# Patient Record
Sex: MALE | Race: WHITE | NOT HISPANIC OR LATINO | Employment: OTHER | URBAN - NONMETROPOLITAN AREA
[De-identification: names, ages, dates, MRNs, and addresses within clinical notes are randomized per-mention and may not be internally consistent; named-entity substitution may affect disease eponyms.]

---

## 2017-02-16 ENCOUNTER — TELEPHONE (OUTPATIENT)
Dept: CARDIOLOGY | Facility: CLINIC | Age: 82
End: 2017-02-16

## 2017-02-16 RX ORDER — DILTIAZEM HYDROCHLORIDE 180 MG/1
180 CAPSULE, COATED, EXTENDED RELEASE ORAL DAILY
Qty: 90 CAPSULE | Refills: 0 | Status: SHIPPED | OUTPATIENT
Start: 2017-02-16 | End: 2017-02-16 | Stop reason: SDUPTHER

## 2017-02-16 RX ORDER — LISINOPRIL 5 MG/1
5 TABLET ORAL DAILY
COMMUNITY
End: 2017-04-13

## 2017-02-16 RX ORDER — DILTIAZEM HYDROCHLORIDE 180 MG/1
180 CAPSULE, COATED, EXTENDED RELEASE ORAL 2 TIMES DAILY
Qty: 180 CAPSULE | Refills: 3 | Status: SHIPPED | OUTPATIENT
Start: 2017-02-16 | End: 2018-12-05 | Stop reason: ALTCHOICE

## 2017-02-16 NOTE — TELEPHONE ENCOUNTER
Received call from fátima at [facility] reports wife in pharmacy requesting refill on patient's diltiazem, script sent into pharmacy.

## 2017-02-16 NOTE — TELEPHONE ENCOUNTER
His heart rate was 68.  We need to make sure how he is taking it.  If only once a day I am afraid BID would drop him too low.

## 2017-02-16 NOTE — TELEPHONE ENCOUNTER
Called patient's wife and went over med list. Abstracted med list into chart. Patient has been taking diltiazem 180 mg BID. Script reflecting BID dosing sent to pharmacy per Demetria's order.

## 2017-02-16 NOTE — TELEPHONE ENCOUNTER
Had refill request to today for Cardizem CD 180mg. He did not have medication list at last visit yet wife brought list with Cardizem CD 180mg once daily. Pharmacy reports he has always took it bid. Wife reports she is not for sure what he is taking. Per chart last refill here bid. Does he need once daily or twice daily? Thanks

## 2017-04-13 ENCOUNTER — HOSPITAL ENCOUNTER (OUTPATIENT)
Dept: CARDIOLOGY | Facility: HOSPITAL | Age: 82
Discharge: HOME | End: 2017-04-13
Admitting: NURSE PRACTITIONER

## 2017-04-13 ENCOUNTER — OUTSIDE FACILITY SERVICE (OUTPATIENT)
Dept: CARDIOLOGY | Facility: CLINIC | Age: 82
End: 2017-04-13

## 2017-04-13 ENCOUNTER — TELEPHONE (OUTPATIENT)
Dept: CARDIOLOGY | Facility: CLINIC | Age: 82
End: 2017-04-13

## 2017-04-13 ENCOUNTER — OFFICE VISIT (OUTPATIENT)
Dept: CARDIOLOGY | Facility: CLINIC | Age: 82
End: 2017-04-13

## 2017-04-13 VITALS
HEART RATE: 76 BPM | SYSTOLIC BLOOD PRESSURE: 152 MMHG | BODY MASS INDEX: 24.08 KG/M2 | DIASTOLIC BLOOD PRESSURE: 94 MMHG | WEIGHT: 172 LBS | HEIGHT: 71 IN

## 2017-04-13 DIAGNOSIS — R94.39 ABNORMAL STRESS TEST: ICD-10-CM

## 2017-04-13 DIAGNOSIS — I34.0 NON-RHEUMATIC MITRAL REGURGITATION: ICD-10-CM

## 2017-04-13 DIAGNOSIS — I25.10 CORONARY ARTERY DISEASE INVOLVING NATIVE CORONARY ARTERY OF NATIVE HEART WITHOUT ANGINA PECTORIS: ICD-10-CM

## 2017-04-13 DIAGNOSIS — I10 ESSENTIAL HYPERTENSION: Primary | ICD-10-CM

## 2017-04-13 DIAGNOSIS — E78.00 HYPERCHOLESTEREMIA: ICD-10-CM

## 2017-04-13 DIAGNOSIS — I10 ESSENTIAL HYPERTENSION: ICD-10-CM

## 2017-04-13 PROCEDURE — 93306 TTE W/DOPPLER COMPLETE: CPT

## 2017-04-13 PROCEDURE — 93306 TTE W/DOPPLER COMPLETE: CPT | Performed by: INTERNAL MEDICINE

## 2017-04-13 PROCEDURE — 99214 OFFICE O/P EST MOD 30 MIN: CPT | Performed by: NURSE PRACTITIONER

## 2017-04-13 RX ORDER — OXYCODONE HYDROCHLORIDE AND ACETAMINOPHEN 5; 325 MG/1; MG/1
1 TABLET ORAL EVERY 6 HOURS PRN
COMMUNITY
End: 2017-10-17 | Stop reason: ALTCHOICE

## 2017-04-13 RX ORDER — FINASTERIDE 5 MG/1
5 TABLET, FILM COATED ORAL DAILY
COMMUNITY

## 2017-04-13 NOTE — TELEPHONE ENCOUNTER
Dr. Hoskins office faxed cardiac clearance request for T10 and T11 kyphoplasty. Asking how many days prior to surgery patient can hold aspirin. Patient seen in office today and echo was ordered.

## 2017-04-13 NOTE — PROGRESS NOTES
Chief Complaint   Patient presents with   • Follow-up     Denies chest pain, shortness of breath, or palpitations. Had another kyphoplasty in november is to have another at the end of this month. Wife is very confused about medication. Has some notes from pharmacist she wants you to look at.        Subjective       Max Velarde is a 82 y.o. male  with a history of chest pain and shortness of breath who had abnormal stress test being managed conservatively. He has developed problems with his back and underwent surgery last year without problem. Today he comes to the office for a follow up appointment. Patient reports recent MRI of back revealed more fractures and needs to have surgery to repair due to significant back pain. From a cardiac standpoint he denies chest pain or palpitations. He does have a history of PVCs. He denies any significant shortness of breath.     HPI         Cardiac History:    Past Surgical History:   Procedure Laterality Date   • CARDIOVASCULAR STRESS TEST  03/17/2008    7 min, 81% THR, Apicoinferior Ischemia.   • CARDIOVASCULAR STRESS TEST  06/15/2010    7 min, 30 sec, 77% THR. Inferior ischemia.   • ECHO - CONVERTED  03/04/2008    EF 60%, Mild AI, Mod MR, Freq PVC's, Anterior WMA.   • ECHO - CONVERTED  04/22/2010    EF 60 %, mild to mod MR       Current Outpatient Prescriptions   Medication Sig Dispense Refill   • carvedilol (COREG) 3.125 MG tablet Take 3.125 mg by mouth 2 (Two) Times a Day With Meals.     • diltiaZEM CD (CARDIZEM CD) 180 MG 24 hr capsule Take 1 capsule by mouth 2 (Two) Times a Day. 180 capsule 3   • finasteride (PROSCAR) 5 MG tablet Take 5 mg by mouth Daily.     • Fluticasone Furoate-Vilanterol (BREO ELLIPTA) 100-25 MCG/INH aerosol powder  Inhale.     • oxyCODONE-acetaminophen (PERCOCET) 5-325 MG per tablet Take 1 tablet by mouth Every 6 (Six) Hours As Needed.     • simvastatin (ZOCOR) 40 MG tablet Take 40 mg by mouth Every Night.       No current facility-administered  "medications for this visit.        Review of patient's allergies indicates no known allergies.    Past Medical History:   Diagnosis Date   • H/O kyphoplasty 08/12/2016   • History of elbow surgery     (r) ELBOW LANCED   • Hypercholesterolemia    • Hyperlipidemia    • Hypertension    • MR (mitral regurgitation)    • PVC's (premature ventricular contractions)        Social History     Social History   • Marital status:      Spouse name: N/A   • Number of children: N/A   • Years of education: N/A     Occupational History   • Not on file.     Social History Main Topics   • Smoking status: Former Smoker     Quit date: 1956   • Smokeless tobacco: Never Used   • Alcohol use No   • Drug use: No   • Sexual activity: Not on file     Other Topics Concern   • Not on file     Social History Narrative       Family History   Problem Relation Age of Onset   • Heart disease Father        Review of Systems   Constitutional: Positive for activity change (related to back pain).   HENT: Negative.  Negative for trouble swallowing.    Respiratory: Negative.    Cardiovascular: Negative.    Gastrointestinal: Negative.    Genitourinary: Negative.    Musculoskeletal: Positive for arthralgias, back pain, myalgias, neck pain and neck stiffness. Negative for gait problem.   Neurological: Negative for dizziness, syncope, speech difficulty and light-headedness.   Psychiatric/Behavioral: Positive for sleep disturbance (related to skeletal issues). Negative for confusion. The patient is not nervous/anxious.        Diabetes- No  Thyroid-normal    Objective     /94  Pulse 76  Ht 71\" (180.3 cm)  Wt 172 lb (78 kg)  BMI 23.99 kg/m2    Physical Exam   Constitutional: He is oriented to person, place, and time.   HENT:   Right Ear: Decreased hearing is noted.   Left Ear: Decreased hearing is noted.   Eyes: Pupils are equal, round, and reactive to light.   Neck: No JVD present. Carotid bruit is not present. No edema present. "   Cardiovascular: Normal rate, regular rhythm, S1 normal and normal pulses.    Murmur heard.   Systolic murmur is present with a grade of 2/6   Slightly loud S2, heart rhythm regular with a skip noted   Pulmonary/Chest: Effort normal and breath sounds normal. He has no wheezes. He has no rales.   Abdominal: Soft. Bowel sounds are normal.   Musculoskeletal: He exhibits no edema.   Neurological: He is alert and oriented to person, place, and time.   Skin: Skin is warm and dry.   Psychiatric: He has a normal mood and affect. His behavior is normal. Judgment and thought content normal.   Vitals reviewed.    Procedures          Assessment/Plan      Max was seen today for follow-up.    Diagnoses and all orders for this visit:    Essential hypertension  -     Adult Transthoracic Echo Complete; Future    Coronary artery disease involving native coronary artery of native heart without angina pectoris  -     Adult Transthoracic Echo Complete; Future    Abnormal stress test  -     Adult Transthoracic Echo Complete; Future    Hypercholesteremia  -     Adult Transthoracic Echo Complete; Future    Non-rheumatic mitral regurgitation         and Mrs. Velarde appear to be having problems managing medication prescriptions due to prescriptions coming from different offices. We discussed requesting all prescriptions come from your office and Mrs. Velarde will discuss with you at his follow up appointment on 24th of this month.    I did not make any medication changes today. He appears to currently be on CCB and BB. In the past he has been on Lisiopril. If blood pressure remains elevated he may benefit in resuming if renal function, sodium and potassium normal.   Mr. Velarde is requesting cardiac clearance for Kyphoplasty by Dr. Jules Valencia.  Due to increased blood pressure and history of abnormal stress test, I have asked for repeat echocardiogram and is scheduled for later today.  Since he denies cardiac symptoms and he feels he  would not be able to lay on table for nuclear scan a repeat nuclear stress test was not ordered at this time.                 Electronically signed by GABRIELE Chen,  April 14, 2017 7:50 AM

## 2017-04-14 ENCOUNTER — TELEPHONE (OUTPATIENT)
Dept: CARDIOLOGY | Facility: CLINIC | Age: 82
End: 2017-04-14

## 2017-04-14 PROBLEM — I34.0 NON-RHEUMATIC MITRAL REGURGITATION: Status: ACTIVE | Noted: 2017-04-14

## 2017-04-14 PROBLEM — E78.00 HYPERCHOLESTEREMIA: Status: ACTIVE | Noted: 2017-04-14

## 2017-04-14 NOTE — TELEPHONE ENCOUNTER
Mr. Velarde requested cardiac clearance be sent to Dr. Jules Valencia office for kyphoplasty. I ordered an echocardiogram which was done yesterday due to being over 5 years. He denies any cardiac symptoms. I did not order stress test since he would not be able to lay on table due to back pain. When echo report is available please verify with Dr. Murguia agree to give clearance. Thanks.

## 2017-10-17 ENCOUNTER — OFFICE VISIT (OUTPATIENT)
Dept: CARDIOLOGY | Facility: CLINIC | Age: 82
End: 2017-10-17

## 2017-10-17 VITALS
SYSTOLIC BLOOD PRESSURE: 130 MMHG | HEIGHT: 71 IN | HEART RATE: 64 BPM | WEIGHT: 163 LBS | DIASTOLIC BLOOD PRESSURE: 90 MMHG | BODY MASS INDEX: 22.82 KG/M2

## 2017-10-17 DIAGNOSIS — R94.39 ABNORMAL STRESS TEST: ICD-10-CM

## 2017-10-17 DIAGNOSIS — I34.0 NON-RHEUMATIC MITRAL REGURGITATION: ICD-10-CM

## 2017-10-17 DIAGNOSIS — I25.10 CORONARY ARTERY DISEASE INVOLVING NATIVE CORONARY ARTERY OF NATIVE HEART WITHOUT ANGINA PECTORIS: Primary | ICD-10-CM

## 2017-10-17 DIAGNOSIS — E78.00 HYPERCHOLESTEREMIA: ICD-10-CM

## 2017-10-17 DIAGNOSIS — I10 ESSENTIAL HYPERTENSION: ICD-10-CM

## 2017-10-17 PROBLEM — I49.3 PVC (PREMATURE VENTRICULAR CONTRACTION): Status: ACTIVE | Noted: 2017-10-17

## 2017-10-17 PROCEDURE — 99213 OFFICE O/P EST LOW 20 MIN: CPT | Performed by: NURSE PRACTITIONER

## 2017-10-17 RX ORDER — LISINOPRIL 10 MG/1
10 TABLET ORAL DAILY
COMMUNITY
End: 2018-12-05 | Stop reason: ALTCHOICE

## 2017-10-17 NOTE — PROGRESS NOTES
Chief Complaint   Patient presents with   • Follow-up     For cardiac management.    • Med Refill     PCP does medication refills.        Cardiac Complaints  none      Subjective   Max Velarde is a 83 y.o. male with a history of chest pain and shortness of breath who had abnormal stress test being managed conservatively. He reported more shortness of breath at last visit and echo was advised which showed normal LV function with mild to moderate MR and no AS reported.  Stress was not advised as he was scheduled to undergo surgery for his back and was not able to lay flat on the table.  He was then cleared to undergo kyphoplasty. He returns today and denies any new cardiac concerns.  He does report emergent surgery in June for bowel obstruction. Which he states he was in the hospital for three weeks for recovery.  Chest pain and shortness of breath denied.  He still admits to palpitations, but no worse than prior.  He states they have been the same for years.  His biggest problem remains back pain, which the patient states is no worse than prior. Labs he reports with PCP, no copy available for review.  No refills are needed today.      Cardiac History  Past Surgical History:   Procedure Laterality Date   • CARDIOVASCULAR STRESS TEST  03/17/2008    7 min, 81% THR, Apicoinferior Ischemia.   • CARDIOVASCULAR STRESS TEST  06/15/2010    7 min, 30 sec, 77% THR. Inferior ischemia.   • ECHO - CONVERTED  03/04/2008    EF 60%, Mild AI, Mod MR, Freq PVC's, Anterior WMA.   • ECHO - CONVERTED  04/22/2010    EF 60 %, mild to mod MR   • ECHO - CONVERTED  04/13/2017    EF 65%, no AS, mild to mod MR       Current Outpatient Prescriptions   Medication Sig Dispense Refill   • carvedilol (COREG) 3.125 MG tablet Take 3.125 mg by mouth 2 (Two) Times a Day With Meals.     • diltiaZEM CD (CARDIZEM CD) 180 MG 24 hr capsule Take 1 capsule by mouth 2 (Two) Times a Day. 180 capsule 3   • finasteride (PROSCAR) 5 MG tablet Take 5 mg by mouth  "Daily.     • Fluticasone Furoate-Vilanterol (BREO ELLIPTA) 100-25 MCG/INH aerosol powder  Inhale.     • lisinopril (PRINIVIL,ZESTRIL) 10 MG tablet Take 10 mg by mouth Daily.     • simvastatin (ZOCOR) 40 MG tablet Take 40 mg by mouth Every Night.       No current facility-administered medications for this visit.        Review of patient's allergies indicates no known allergies.    Past Medical History:   Diagnosis Date   • H/O kyphoplasty 08/12/2016   • History of elbow surgery     (r) ELBOW LANCED   • Hypercholesterolemia    • Hyperlipidemia    • Hypertension    • MR (mitral regurgitation)    • PVC's (premature ventricular contractions)        Social History     Social History   • Marital status:      Spouse name: N/A   • Number of children: N/A   • Years of education: N/A     Occupational History   • Not on file.     Social History Main Topics   • Smoking status: Former Smoker     Quit date: 1956   • Smokeless tobacco: Never Used   • Alcohol use No   • Drug use: No   • Sexual activity: Not on file     Other Topics Concern   • Not on file     Social History Narrative       Family History   Problem Relation Age of Onset   • Heart disease Father        Review of Systems   Constitution: Negative for weakness and malaise/fatigue.   Cardiovascular: Negative for chest pain, dyspnea on exertion, irregular heartbeat and palpitations.   Respiratory: Negative for shortness of breath and wheezing.    Musculoskeletal: Positive for back pain and joint pain.   Gastrointestinal: Negative for anorexia, heartburn and nausea.   Genitourinary: Negative for dysuria, hesitancy and nocturia.   Neurological: Negative for dizziness, focal weakness and headaches.   Psychiatric/Behavioral: Negative for altered mental status and depression.       DiabetesNo  Thyroidnormal    Objective     /90 (BP Location: Right arm)  Pulse 64  Ht 71\" (180.3 cm)  Wt 163 lb (73.9 kg)  BMI 22.73 kg/m2    Physical Exam   Constitutional: He is " oriented to person, place, and time. He appears well-developed and well-nourished.   HENT:   Head: Normocephalic and atraumatic.   Eyes: EOM are normal. Pupils are equal, round, and reactive to light.   Neck: Normal range of motion. Neck supple.   Cardiovascular: Normal rate and regular rhythm.    Pulmonary/Chest: Effort normal and breath sounds normal.   Abdominal: Soft.   Musculoskeletal: Normal range of motion.   Neurological: He is alert and oriented to person, place, and time.   Skin: Skin is warm and dry.   Psychiatric: He has a normal mood and affect. His behavior is normal.       Procedures    Assessment/Plan     HR and BP are both stable.  No changes to current regimen advised.  No new testing will be advised today as on new concerns are voiced today.  Most recent echo findings discussed with patient.  He prefers to continue to hold off on stress as no new concerns voiced and he can not lay flat on the table. Labs are done with your office, no recent copy available to me.  Could we have most recent copy for our records?  No refills sent today as they are done with PCP.  Good cardiac diet and activity as tolerated advised. 6 month follow up advised or sooner if needed.      Problems Addressed this Visit        Cardiovascular and Mediastinum    HTN (hypertension)    Relevant Medications    lisinopril (PRINIVIL,ZESTRIL) 10 MG tablet    CAD (coronary artery disease) - Primary    Abnormal stress test    Hypercholesteremia    Non-rheumatic mitral regurgitation                  Electronically signed by GABRIELE Devine October 17, 2017 4:31 PM

## 2018-01-12 ENCOUNTER — TELEPHONE (OUTPATIENT)
Dept: CARDIOLOGY | Facility: CLINIC | Age: 83
End: 2018-01-12

## 2018-04-17 ENCOUNTER — OFFICE VISIT (OUTPATIENT)
Dept: CARDIOLOGY | Facility: CLINIC | Age: 83
End: 2018-04-17

## 2018-04-17 VITALS
HEART RATE: 80 BPM | DIASTOLIC BLOOD PRESSURE: 70 MMHG | WEIGHT: 153 LBS | HEIGHT: 71 IN | BODY MASS INDEX: 21.42 KG/M2 | SYSTOLIC BLOOD PRESSURE: 150 MMHG

## 2018-04-17 DIAGNOSIS — E78.00 HYPERCHOLESTEREMIA: ICD-10-CM

## 2018-04-17 DIAGNOSIS — R94.39 ABNORMAL STRESS TEST: ICD-10-CM

## 2018-04-17 DIAGNOSIS — I34.0 NON-RHEUMATIC MITRAL REGURGITATION: ICD-10-CM

## 2018-04-17 DIAGNOSIS — I25.10 CORONARY ARTERY DISEASE INVOLVING NATIVE CORONARY ARTERY OF NATIVE HEART WITHOUT ANGINA PECTORIS: ICD-10-CM

## 2018-04-17 DIAGNOSIS — I10 ESSENTIAL HYPERTENSION: Primary | ICD-10-CM

## 2018-04-17 PROCEDURE — 99213 OFFICE O/P EST LOW 20 MIN: CPT | Performed by: NURSE PRACTITIONER

## 2018-04-17 RX ORDER — ASPIRIN 81 MG/1
81 TABLET ORAL DAILY
COMMUNITY

## 2018-04-17 RX ORDER — CETIRIZINE HYDROCHLORIDE 10 MG/1
10 TABLET ORAL DAILY
COMMUNITY

## 2018-04-17 NOTE — PROGRESS NOTES
"Chief Complaint   Patient presents with   • Follow-up     Cardiac management. He reports has back procedure per Dr Suggs this year. He reports having some soreness and pain, patient states \" has barrell chest\", has seen Dr Cummings, patient reports was told he would not have anything done at this time. Is to see PCP next week and to have labs. PCP writes refills on medication.       Subjective       Max Velarde is a 83 y.o. male with a history of hypertension, hyperlipidemia who underwent stress testing for chest pain and shortness of breath which showed inferior wall ischemia.  He remained mostly asymptomatic, so he has been managed conservatively.  He reported more shortness of breath last year, and echo was repeated showing good LV function, no aortic stenosis, and mild to moderate MR.  Stress test was not done secondary to not being able to lay flat on the table.  He has significant osteoporosis and has had multiple stress fractures and kyphoplasties done.  He came in today for his follow up visit.  He denies any anginal pain, shortness of breath, or dizziness.  His chief complaint is \"pain all over his body\" from his skeletal problems.  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 Cummings who recommended no intervention and only conservative management.  He has received injections by Dr. Riojas but not helping him much.  He is asking about pain medication.  Refills and labs have been managed by PCP.       HPI         Cardiac History:    Past Surgical History:   Procedure Laterality Date   • CARDIOVASCULAR STRESS TEST  03/17/2008    7 min, 81% THR, Apicoinferior Ischemia.   • CARDIOVASCULAR STRESS TEST  06/15/2010    7 min, 30 sec, 77% THR. Inferior ischemia.   • ECHO - CONVERTED  03/04/2008    EF 60%, Mild AI, Mod MR, Freq PVC's, Anterior WMA.   • ECHO - CONVERTED  04/22/2010    EF 60 %, mild to mod MR   • ECHO - CONVERTED  04/13/2017    EF 65%, " no AS, mild to mod MR       Current Outpatient Prescriptions   Medication Sig Dispense Refill   • aspirin 81 MG EC tablet Take 81 mg by mouth Daily.     • carvedilol (COREG) 3.125 MG tablet Take 3.125 mg by mouth 2 (Two) Times a Day With Meals.     • cetirizine (zyrTEC) 10 MG tablet Take 10 mg by mouth Daily.     • diltiaZEM CD (CARDIZEM CD) 180 MG 24 hr capsule Take 1 capsule by mouth 2 (Two) Times a Day. 180 capsule 3   • finasteride (PROSCAR) 5 MG tablet Take 5 mg by mouth Daily.     • Fluticasone Furoate-Vilanterol (BREO ELLIPTA) 100-25 MCG/INH aerosol powder  Inhale Daily.     • lisinopril (PRINIVIL,ZESTRIL) 10 MG tablet Take 10 mg by mouth Daily.     • simvastatin (ZOCOR) 40 MG tablet Take 40 mg by mouth Every Night.       No current facility-administered medications for this visit.        Review of patient's allergies indicates no known allergies.    Past Medical History:   Diagnosis Date   • H/O kyphoplasty 08/12/2016   • History of back surgery    • History of elbow surgery     (r) ELBOW LANCED   • Hypercholesterolemia    • Hyperlipidemia    • Hypertension    • MR (mitral regurgitation)    • PVC's (premature ventricular contractions)        Social History     Social History   • Marital status:      Spouse name: N/A   • Number of children: N/A   • Years of education: N/A     Occupational History   • Not on file.     Social History Main Topics   • Smoking status: Former Smoker     Quit date: 1956   • Smokeless tobacco: Never Used   • Alcohol use No   • Drug use: No   • Sexual activity: Not on file     Other Topics Concern   • Not on file     Social History Narrative   • No narrative on file       Family History   Problem Relation Age of Onset   • Heart disease Father        Review of Systems   Constitution: Positive for decreased appetite and malaise/fatigue.   HENT: Negative.    Eyes: Positive for blurred vision (recent cataract surgery).   Cardiovascular: Negative for chest pain, dyspnea on exertion,  "leg swelling, orthopnea, palpitations and syncope.   Respiratory: Negative for cough and shortness of breath.    Endocrine: Negative.    Hematologic/Lymphatic: Negative.    Skin: Negative.    Musculoskeletal: Positive for arthritis, back pain and joint pain. Negative for falls and myalgias.        \"sore all over\"   Gastrointestinal: Positive for constipation. Negative for abdominal pain and melena.   Genitourinary: Negative for dysuria and hematuria.   Neurological: Negative for dizziness and paresthesias.   Psychiatric/Behavioral: Positive for depression (feeling down about physical limitations). Negative for altered mental status.   Allergic/Immunologic: Negative.         Diabetes- No  Thyroid-normal, no labs available for review     Objective     /70 (BP Location: Right arm)   Pulse 80   Ht 180.3 cm (70.98\")   Wt 69.4 kg (153 lb)   BMI 21.35 kg/m²     Physical Exam   Constitutional: He appears well-developed and well-nourished.   HENT:   Head: Normocephalic.   Eyes: Pupils are equal, round, and reactive to light.   Neck: Normal range of motion.   Cardiovascular: Normal rate, regular rhythm and intact distal pulses.    Murmur heard.  Pulmonary/Chest: Effort normal and breath sounds normal. No respiratory distress. He has no wheezes. He has no rales. He exhibits tenderness.   Abdominal: Soft. Bowel sounds are normal. He exhibits no distension. There is no tenderness.   Musculoskeletal: He exhibits tenderness and deformity.   Significant kyphosis and barrel chest  Head protruding forward   Skin: Skin is warm and dry. No pallor.   Psychiatric: He has a normal mood and affect.   Nursing note and vitals reviewed.     Procedures          Assessment/Plan    Heart rate and blood pressure stable.  I explained to him about his last echocardiogram showing normal LV function, no AS, and only mild to moderate MR.  He remains asymptomatic cardiacwise, so no further testing done.  His blood pressure is upper limit of " normal.  No changes to medications.  Continue lisinopril, Coreg, diltiazem, Zocor, and aspirin.  He plans to see you next week for follow up and labs.  Could we get a copy?  He is advised to talk with PCP or Dr. Riojas about pain management options as he does appear to be very uncomfortable.  Should he develop any angina, shortness of breath, or dizziness, he is advised to contact our office and work up will be considered.  His weight has steadily declined with Body mass index is 21.35 kg/m².  He doesn't have much of an appetite.  Recommend daily Ensure and his wife says they have this at home.  Encouraged adequate fluid intake and fiber intake with his history of bowel obstruction and surgery.  We will see him back in six months or sooner if needed.      Max was seen today for follow-up.    Diagnoses and all orders for this visit:    Essential hypertension    Hypercholesteremia    Coronary artery disease involving native coronary artery of native heart without angina pectoris    Abnormal stress test    Non-rheumatic mitral regurgitation                    Electronically signed by GABRIELE Molina,  April 17, 2018 11:20 AM

## 2018-09-12 NOTE — PROGRESS NOTES
Chief Complaint   Patient presents with   • Follow-up     Was in hospital recently for abnormal ABG that was done at Dr. Massey's office. Family reports that he had a lot of fluid taken off. Had echo done in hospital but pt was wanting to go home before echo was read so hospital told pt that he would need to see our office to get echo results.        Subjective       Max Velarde is a 84 y.o. male with a history of hypertension, hyperlipidemia who underwent stress testing for chest pain and shortness of breath which showed inferior wall ischemia.  He remained mostly asymptomatic, so he has been managed conservatively.  He reported more shortness of breath 2017, and echo was repeated showing good LV function, no aortic stenosis, and mild to moderate MR.  Stress test was not done secondary to not being able to lay flat on the table.  He has significant osteoporosis and has had multiple stress fractures and kyphoplasties done. He was diagnosed with multiple myeloma with involvement of thoracic and lumbar spine and right shoulder, managed by Dr. Smith. On 9/7/18, he was admitted to [facility] with weakness and increased shortness of breath, diagnosed with bilateral pneumonia, possible acute CHF. He responded well to treatment and was discharged home prior to echo report available.  Today he comes to the office for a hospital follow up visit. He reports shortness of breath and weakness remain improved since discharge. He is currently taking Lasix daily. His wife is concerned given he does not drink much fluid. Chemotherapy treatments are currently on hold due to recent pneumonia and plans to see Dr. Smith next week. He reports his back is being managed well and he is walking with use of walker. From a cardiac standpoint he denies chest pain or palpitations.     HPI     Cardiac History:    Past Surgical History:   Procedure Laterality Date   • CARDIOVASCULAR STRESS TEST  03/17/2008    7 min, 81% THR, Apicoinferior Ischemia.    • CARDIOVASCULAR STRESS TEST  06/15/2010    7 min, 30 sec, 77% THR. Inferior ischemia.   • ECHO - CONVERTED  03/04/2008    EF 60%, Mild AI, Mod MR, Freq PVC's, Anterior WMA.   • ECHO - CONVERTED  04/22/2010    EF 60 %, mild to mod MR   • ECHO - CONVERTED  04/13/2017    EF 65%, no AS, mild to mod MR   • ECHO - CONVERTED  09/10/2018    [facility]- Dr. Csatillo- LVEF 50-55%, mild to moderate diastolic dysfunction, mild AI, mild MR, RVSP 44 mmHg       Current Outpatient Prescriptions   Medication Sig Dispense Refill   • Arformoterol Tartrate (BROVANA IN) Inhale.     • aspirin 81 MG EC tablet Take 81 mg by mouth Daily.     • budesonide (PULMICORT) 180 MCG/ACT inhaler Inhale 1 puff 2 (Two) Times a Day.     • calcium carbonate (OS-DEENA) 600 MG tablet Take 600 mg by mouth Daily.     • carvedilol (COREG) 3.125 MG tablet Take 3.125 mg by mouth 2 (Two) Times a Day With Meals.     • cetirizine (zyrTEC) 10 MG tablet Take 10 mg by mouth Daily.     • diltiaZEM CD (CARDIZEM CD) 180 MG 24 hr capsule Take 1 capsule by mouth 2 (Two) Times a Day. 180 capsule 3   • docusate calcium (SURFAK) 240 MG capsule Take 240 mg by mouth As Needed for Constipation.     • finasteride (PROSCAR) 5 MG tablet Take 5 mg by mouth Daily.     • furosemide (LASIX) 40 MG tablet Once a day as needed for swelling or increased shortness of breath 30 tablet 6   • levoFLOXacin (LEVAQUIN) 500 MG tablet Take 500 mg by mouth Daily.     • lisinopril (PRINIVIL,ZESTRIL) 10 MG tablet Take 10 mg by mouth Daily.     • Morphine (MSIR) 30 MG tablet Take 30 mg by mouth 2 (Two) Times a Day.     • oxyCODONE (oxyCONTIN) 10 MG 12 hr tablet Take 10 mg by mouth. Take 1-2 tabs every 6 hours as needed     • simvastatin (ZOCOR) 40 MG tablet Take 40 mg by mouth Every Night.       No current facility-administered medications for this visit.        Patient has no known allergies.    Past Medical History:   Diagnosis Date   • H/O kyphoplasty 08/12/2016   • History of back surgery    • History  "of elbow surgery     (r) ELBOW LANCED   • Hypercholesterolemia    • Hyperlipidemia    • Hypertension    • MR (mitral regurgitation)    • PVC's (premature ventricular contractions)        Social History     Social History   • Marital status:      Spouse name: N/A   • Number of children: N/A   • Years of education: N/A     Occupational History   • Not on file.     Social History Main Topics   • Smoking status: Former Smoker     Quit date: 1956   • Smokeless tobacco: Never Used   • Alcohol use No   • Drug use: No   • Sexual activity: Not on file     Other Topics Concern   • Not on file     Social History Narrative   • No narrative on file       Family History   Problem Relation Age of Onset   • Heart disease Father        Review of Systems   Constitution: Positive for weakness and malaise/fatigue. Negative for decreased appetite, diaphoresis and fever.   HENT: Negative for congestion and nosebleeds.    Eyes: Negative for redness and visual disturbance.   Cardiovascular: Positive for leg swelling (mild). Negative for chest pain and palpitations.   Respiratory: Positive for cough and shortness of breath. Negative for sputum production.    Endocrine: Negative for polydipsia, polyphagia and polyuria.   Hematologic/Lymphatic: Negative for bleeding problem. Bruises/bleeds easily.   Skin: Positive for dry skin. Negative for itching.   Musculoskeletal: Positive for joint pain and muscle weakness. Negative for falls.   Gastrointestinal: Negative for change in bowel habit and nausea.   Genitourinary: Negative for dysuria and hematuria.   Neurological: Positive for loss of balance. Negative for dizziness.   Psychiatric/Behavioral: Negative for memory loss. The patient is nervous/anxious.         Objective     /68   Pulse 60   Ht 180.3 cm (71\")   Wt 68.9 kg (152 lb)   BMI 21.20 kg/m²     Physical Exam   Constitutional: He is oriented to person, place, and time. Vital signs are normal. He appears well-nourished. He " has a sickly appearance.   HENT:   Head: Normocephalic.   Eyes: Pupils are equal, round, and reactive to light. Conjunctivae are normal.   Neck: Normal range of motion. Neck supple.   Cardiovascular: Normal rate, regular rhythm, S1 normal and S2 normal.    No murmur heard.  Pulmonary/Chest: No respiratory distress. He has decreased breath sounds. He has no rales.   Abdominal: Soft. Bowel sounds are normal. There is no tenderness.   Musculoskeletal: Normal range of motion. He exhibits edema (trace lower legs).   Neurological: He is alert and oriented to person, place, and time.   Skin: Skin is warm and dry. There is pallor.   Psychiatric: He has a normal mood and affect.      Procedures: none today        Assessment/Plan      Max was seen today for follow-up.    Diagnoses and all orders for this visit:    Essential hypertension    Hypercholesteremia    Non-rheumatic mitral regurgitation    Diastolic dysfunction    Shortness of breath    Other orders  -     furosemide (LASIX) 40 MG tablet; Once a day as needed for swelling or increased shortness of breath    The report of his recent echocardiogram reviewed which showed mild to moderate diastolic dysfunction with LVEF 50-55%. MR and AI noted as mild. His acute heart failure responded well to treatment. 9/10/18 lab report showed BUN 20 and creatinine 0.8. At this time, we will continue Lasix on as needed basis for shortness of breath or swelling. His wife agrees to monitor and understands to call should he require taking daily so dose adjustment can be considered and labs checked.     His vital signs are normal today. Continue same dose Coreg and Lisinopril. He is taking daily aspirin.     For cholesterol management, he is taking Simvastatin and will follow with you for management of labs including lipid panel.     Patient's Body mass index is 21.2 kg/m². BMI is within normal parameters. No follow-up required.  Continue diet to maintain weight.    A 6 month follow up  visit scheduled. Please call sooner for any cardiac concerns.            Electronically signed by GABRIELE Chen,  September 13, 2018 10:16 AM

## 2018-09-13 ENCOUNTER — OFFICE VISIT (OUTPATIENT)
Dept: CARDIOLOGY | Facility: CLINIC | Age: 83
End: 2018-09-13

## 2018-09-13 VITALS
HEART RATE: 60 BPM | DIASTOLIC BLOOD PRESSURE: 68 MMHG | WEIGHT: 152 LBS | BODY MASS INDEX: 21.28 KG/M2 | HEIGHT: 71 IN | SYSTOLIC BLOOD PRESSURE: 118 MMHG

## 2018-09-13 DIAGNOSIS — I34.0 NON-RHEUMATIC MITRAL REGURGITATION: ICD-10-CM

## 2018-09-13 DIAGNOSIS — I51.89 DIASTOLIC DYSFUNCTION: ICD-10-CM

## 2018-09-13 DIAGNOSIS — E78.00 HYPERCHOLESTEREMIA: ICD-10-CM

## 2018-09-13 DIAGNOSIS — R06.02 SHORTNESS OF BREATH: ICD-10-CM

## 2018-09-13 DIAGNOSIS — I10 ESSENTIAL HYPERTENSION: Primary | ICD-10-CM

## 2018-09-13 PROCEDURE — 99213 OFFICE O/P EST LOW 20 MIN: CPT | Performed by: NURSE PRACTITIONER

## 2018-09-13 RX ORDER — FUROSEMIDE 40 MG/1
40 TABLET ORAL DAILY
COMMUNITY
End: 2018-09-13 | Stop reason: SDUPTHER

## 2018-09-13 RX ORDER — OXYCODONE HCL 10 MG/1
10 TABLET, FILM COATED, EXTENDED RELEASE ORAL
COMMUNITY
End: 2018-12-05

## 2018-09-13 RX ORDER — FUROSEMIDE 40 MG/1
TABLET ORAL
Qty: 30 TABLET | Refills: 6 | Status: SHIPPED | OUTPATIENT
Start: 2018-09-13 | End: 2018-12-05 | Stop reason: DRUGHIGH

## 2018-09-13 RX ORDER — DOCUSATE CALCIUM 240 MG
240 CAPSULE ORAL AS NEEDED
COMMUNITY

## 2018-09-13 RX ORDER — MORPHINE SULFATE 30 MG/1
30 TABLET ORAL 2 TIMES DAILY
COMMUNITY
End: 2018-12-05 | Stop reason: DRUGHIGH

## 2018-09-13 RX ORDER — PHENOL 1.4 %
600 AEROSOL, SPRAY (ML) MUCOUS MEMBRANE DAILY
COMMUNITY

## 2018-09-13 RX ORDER — LEVOFLOXACIN 500 MG/1
500 TABLET, FILM COATED ORAL DAILY
COMMUNITY
End: 2018-12-05

## 2018-09-13 NOTE — PATIENT INSTRUCTIONS
Furosemide tablets  What is this medicine?  FUROSEMIDE (paulette [place] se mide) is a diuretic. It helps you make more urine and to lose salt and excess water from your body. This medicine is used to treat high blood pressure, and edema or swelling from heart, kidney, or liver disease.  This medicine may be used for other purposes; ask your health care provider or pharmacist if you have questions.  COMMON BRAND NAME(S): Active-Medicated Specimen Kit, Delone, Diuscreen, Lasix, RX Specimen Collection Kit, Specimen Collection Kit, URINX Medicated Specimen Collection  What should I tell my health care provider before I take this medicine?  They need to know if you have any of these conditions:  -abnormal blood electrolytes  -diarrhea or vomiting  -gout  -heart disease  -kidney disease, small amounts of urine, or difficulty passing urine  -liver disease  -thyroid disease  -an unusual or allergic reaction to furosemide, sulfa drugs, other medicines, foods, dyes, or preservatives  -pregnant or trying to get pregnant  -breast-feeding  How should I use this medicine?  Take this medicine by mouth with a glass of water. Follow the directions on the prescription label. You may take this medicine with or without food. If it upsets your stomach, take it with food or milk. Do not take your medicine more often than directed. Remember that you will need to pass more urine after taking this medicine. Do not take your medicine at a time of day that will cause you problems. Do not take at bedtime.  Talk to your pediatrician regarding the use of this medicine in children. While this drug may be prescribed for selected conditions, precautions do apply.  Overdosage: If you think you have taken too much of this medicine contact a poison control center or emergency room at once.  NOTE: This medicine is only for you. Do not share this medicine with others.  What if I miss a dose?  If you miss a dose, take it as soon as you can. If it is almost time  for your next dose, take only that dose. Do not take double or extra doses.  What may interact with this medicine?  -aspirin and aspirin-like medicines  -certain antibiotics  -chloral hydrate  -cisplatin  -cyclosporine  -digoxin  -diuretics  -laxatives  -lithium  -medicines for blood pressure  -medicines that relax muscles for surgery  -methotrexate  -NSAIDs, medicines for pain and inflammation like ibuprofen, naproxen, or indomethacin  -phenytoin  -steroid medicines like prednisone or cortisone  -sucralfate  -thyroid hormones  This list may not describe all possible interactions. Give your health care provider a list of all the medicines, herbs, non-prescription drugs, or dietary supplements you use. Also tell them if you smoke, drink alcohol, or use illegal drugs. Some items may interact with your medicine.  What should I watch for while using this medicine?  Visit your doctor or health care professional for regular checks on your progress. Check your blood pressure regularly. Ask your doctor or health care professional what your blood pressure should be, and when you should contact him or her. If you are a diabetic, check your blood sugar as directed.  You may need to be on a special diet while taking this medicine. Check with your doctor. Also, ask how many glasses of fluid you need to drink a day. You must not get dehydrated.  You may get drowsy or dizzy. Do not drive, use machinery, or do anything that needs mental alertness until you know how this drug affects you. Do not stand or sit up quickly, especially if you are an older patient. This reduces the risk of dizzy or fainting spells. Alcohol can make you more drowsy and dizzy. Avoid alcoholic drinks.  This medicine can make you more sensitive to the sun. Keep out of the sun. If you cannot avoid being in the sun, wear protective clothing and use sunscreen. Do not use sun lamps or tanning beds/booths.  What side effects may I notice from receiving this  medicine?  Side effects that you should report to your doctor or health care professional as soon as possible:  -blood in urine or stools  -dry mouth  -fever or chills  -hearing loss or ringing in the ears  -irregular heartbeat  -muscle pain or weakness, cramps  -skin rash  -stomach upset, pain, or nausea  -tingling or numbness in the hands or feet  -unusually weak or tired  -vomiting or diarrhea  -yellowing of the eyes or skin  Side effects that usually do not require medical attention (report to your doctor or health care professional if they continue or are bothersome):  -headache  -loss of appetite  -unusual bleeding or bruising  This list may not describe all possible side effects. Call your doctor for medical advice about side effects. You may report side effects to FDA at 6-595-FDA-2938.  Where should I keep my medicine?  Keep out of the reach of children.  Store at room temperature between 15 and 30 degrees C (59 and 86 degrees F). Protect from light. Throw away any unused medicine after the expiration date.  NOTE: This sheet is a summary. It may not cover all possible information. If you have questions about this medicine, talk to your doctor, pharmacist, or health care provider.  © 2018 [employer] Standard (2016-03-09 13:49:50)

## 2018-10-15 ENCOUNTER — OUTSIDE FACILITY SERVICE (OUTPATIENT)
Dept: CARDIOLOGY | Facility: CLINIC | Age: 83
End: 2018-10-15

## 2018-10-15 PROCEDURE — 93306 TTE W/DOPPLER COMPLETE: CPT | Performed by: INTERNAL MEDICINE

## 2018-10-15 PROCEDURE — 99223 1ST HOSP IP/OBS HIGH 75: CPT | Performed by: INTERNAL MEDICINE

## 2018-10-16 ENCOUNTER — OUTSIDE FACILITY SERVICE (OUTPATIENT)
Dept: CARDIOLOGY | Facility: CLINIC | Age: 83
End: 2018-10-16

## 2018-10-16 PROCEDURE — 99232 SBSQ HOSP IP/OBS MODERATE 35: CPT | Performed by: INTERNAL MEDICINE

## 2018-10-17 ENCOUNTER — OUTSIDE FACILITY SERVICE (OUTPATIENT)
Dept: CARDIOLOGY | Facility: CLINIC | Age: 83
End: 2018-10-17

## 2018-10-17 PROCEDURE — 99232 SBSQ HOSP IP/OBS MODERATE 35: CPT | Performed by: INTERNAL MEDICINE

## 2018-10-18 ENCOUNTER — OUTSIDE FACILITY SERVICE (OUTPATIENT)
Dept: CARDIOLOGY | Facility: CLINIC | Age: 83
End: 2018-10-18

## 2018-10-18 PROCEDURE — 99232 SBSQ HOSP IP/OBS MODERATE 35: CPT | Performed by: INTERNAL MEDICINE

## 2018-10-19 ENCOUNTER — OUTSIDE FACILITY SERVICE (OUTPATIENT)
Dept: CARDIOLOGY | Facility: CLINIC | Age: 83
End: 2018-10-19

## 2018-10-19 PROCEDURE — 99232 SBSQ HOSP IP/OBS MODERATE 35: CPT | Performed by: INTERNAL MEDICINE

## 2018-10-22 ENCOUNTER — OUTSIDE FACILITY SERVICE (OUTPATIENT)
Dept: CARDIOLOGY | Facility: CLINIC | Age: 83
End: 2018-10-22

## 2018-10-22 PROCEDURE — 99232 SBSQ HOSP IP/OBS MODERATE 35: CPT | Performed by: INTERNAL MEDICINE

## 2018-10-24 ENCOUNTER — OUTSIDE FACILITY SERVICE (OUTPATIENT)
Dept: CARDIOLOGY | Facility: CLINIC | Age: 83
End: 2018-10-24

## 2018-10-24 PROCEDURE — 99232 SBSQ HOSP IP/OBS MODERATE 35: CPT | Performed by: INTERNAL MEDICINE

## 2018-11-07 ENCOUNTER — TELEPHONE (OUTPATIENT)
Dept: CARDIOLOGY | Facility: CLINIC | Age: 83
End: 2018-11-07

## 2018-11-07 NOTE — TELEPHONE ENCOUNTER
Tika with [facility] left a voicemail to clarify discharge medications regarding metoprolol, coreg, diltiazem, and lisinopril.  We did not have a discharge summary.  I printed discharge summary (11/6/18), discharge summary (10/24/18), and discharge summary (10/16/18).    Most recent discharge summary has metoprolol 100 mg BID.    I spoke with Irma at [facility] and advised her that  patient was discharged on metoprolol 100 mg BID.      Patient has had multiple admissions and rehab unit.  Previous notes show on 10/17/18 that Coreg, Diltiazem, and Lisinopril had been stopped.

## 2018-11-30 ENCOUNTER — TELEPHONE (OUTPATIENT)
Dept: CARDIOLOGY | Facility: CLINIC | Age: 83
End: 2018-11-30

## 2018-11-30 NOTE — TELEPHONE ENCOUNTER
rIma with [employer] called.  Patient's wife had called [employer] after hours last night to report patient is having symptoms of dizziness and weakness for about an hour after taking metoprolol 100 mg BID then symptoms resolve after about an hour.  Irma reviewed his vitals log from his home visit.   BP averaging 115-119/60's and pulse 60's    Dr. Murguia consulted patient at [facility] on 10/15/18.  Advised patient would need a hsp follow.  There has been multiple med changes during his admission and rehab.      I will give Anne a note to contact patient and schedule.

## 2018-12-05 ENCOUNTER — OFFICE VISIT (OUTPATIENT)
Dept: CARDIOLOGY | Facility: CLINIC | Age: 83
End: 2018-12-05

## 2018-12-05 VITALS
HEART RATE: 73 BPM | HEIGHT: 71 IN | WEIGHT: 145.25 LBS | DIASTOLIC BLOOD PRESSURE: 70 MMHG | BODY MASS INDEX: 20.33 KG/M2 | SYSTOLIC BLOOD PRESSURE: 122 MMHG

## 2018-12-05 DIAGNOSIS — I34.0 NON-RHEUMATIC MITRAL REGURGITATION: ICD-10-CM

## 2018-12-05 DIAGNOSIS — E78.00 HYPERCHOLESTEREMIA: ICD-10-CM

## 2018-12-05 DIAGNOSIS — I51.9 LV DYSFUNCTION: Primary | ICD-10-CM

## 2018-12-05 DIAGNOSIS — I10 ESSENTIAL HYPERTENSION: ICD-10-CM

## 2018-12-05 DIAGNOSIS — Z79.899 MEDICATION MANAGEMENT: ICD-10-CM

## 2018-12-05 DIAGNOSIS — I49.3 PVC (PREMATURE VENTRICULAR CONTRACTION): ICD-10-CM

## 2018-12-05 PROCEDURE — 99213 OFFICE O/P EST LOW 20 MIN: CPT | Performed by: NURSE PRACTITIONER

## 2018-12-05 RX ORDER — FUROSEMIDE 40 MG/1
40 TABLET ORAL 2 TIMES DAILY
COMMUNITY

## 2018-12-05 RX ORDER — CELECOXIB 200 MG/1
200 CAPSULE ORAL DAILY
COMMUNITY
End: 2019-01-01 | Stop reason: ALTCHOICE

## 2018-12-05 RX ORDER — MORPHINE SULFATE 15 MG/1
TABLET ORAL
COMMUNITY

## 2018-12-05 RX ORDER — METOPROLOL TARTRATE 100 MG/1
100 TABLET ORAL 2 TIMES DAILY
COMMUNITY

## 2018-12-05 NOTE — PROGRESS NOTES
Chief Complaint   Patient presents with   • Follow-up     Hospital Follow-up. Fractured right hip, hospitalized for 1 month and contracted pneumonia.Denies chest pain, palpitations and shortness of breath. Also had shingles while in hospital   • Med Refill     PCP usually refills medications       Subjective       Max Velarde is a 84 y.o. male with a history of hypertension, hyperlipidemia who underwent stress testing for chest pain and shortness of breath which showed inferior wall ischemia.  He remained mostly asymptomatic, so he has been managed conservatively.  He reported more shortness of breath 2017, and echo was repeated showing good LV function, no aortic stenosis, and mild to moderate MR.  Stress test was not done secondary to not being able to lay flat on the table.  He has significant osteoporosis and has had multiple stress fractures and kyphoplasties done. He was diagnosed with multiple myeloma with involvement of thoracic and lumbar spine and right shoulder, managed by Dr. Smith. On 9/7/18, he was admitted to [facility] with weakness and increased shortness of breath, diagnosed with bilateral pneumonia, possible acute CHF. He responded well to treatment and was discharged home prior to echo report available. 9/10/18 echo report per Dr. Castillo showed LVEF 50-55%, mild MR and AI, RVSP 44 mmHg. Lasix was prescribed on prn basis. In October 2018, he experienced a fall resulting in right femur fracture. He underwent surgery wihtout complications. Rhythm showed multiple PVCs and PACs, no PAF. Caridac enzymes were mildly elevated and Dr. Murguia consulted. Echocardiogram ordered and showed LVEF stable at 45-50%. He developed heart failure symptoms and pneumonia, later gout and shingles. After month stay in hospital he was discharged with home health.     Today he comes to the office for a hospital follow up visit. He reports he is doing much better. He uses walker for ambulation and has not had any recent  falls. Medication changes are noted. He did ask if needed to continue lasix twice a day as he has not had swelling and keeps him from resting at night. Home health is currently monitoring vital signs and his wife states have been good.         HPI     Cardiac History:    Past Surgical History:   Procedure Laterality Date   • CARDIOVASCULAR STRESS TEST  03/17/2008    7 min, 81% THR, Apicoinferior Ischemia.   • CARDIOVASCULAR STRESS TEST  06/15/2010    7 min, 30 sec, 77% THR. Inferior ischemia.   • ECHO - CONVERTED  03/04/2008    EF 60%, Mild AI, Mod MR, Freq PVC's, Anterior WMA.   • ECHO - CONVERTED  04/22/2010    EF 60 %, mild to mod MR   • ECHO - CONVERTED  04/13/2017    EF 65%, no AS, mild to mod MR   • ECHO - CONVERTED  09/10/2018    [facility]- Dr. Castillo- LVEF 50-55%, mild to moderate diastolic dysfunction, mild AI, mild MR, RVSP 44 mmHg   • ECHO - CONVERTED  10/15/2018    LVEF 45-50%, mild MR, PA pressure not calc       Current Outpatient Medications   Medication Sig Dispense Refill   • Arformoterol Tartrate (BROVANA IN) Inhale.     • aspirin 81 MG EC tablet Take 81 mg by mouth Daily.     • budesonide (PULMICORT) 180 MCG/ACT inhaler Inhale 1 puff 2 (Two) Times a Day.     • calcium carbonate (OS-DEENA) 600 MG tablet Take 600 mg by mouth Daily.     • celecoxib (CeleBREX) 200 MG capsule Take 200 mg by mouth Daily.     • cetirizine (zyrTEC) 10 MG tablet Take 10 mg by mouth Daily.     • docusate calcium (SURFAK) 240 MG capsule Take 240 mg by mouth As Needed for Constipation.     • FERROUS SULFATE ER PO Take 1 tablet by mouth.     • finasteride (PROSCAR) 5 MG tablet Take 5 mg by mouth Daily.     • furosemide (LASIX) 40 MG tablet Take 40 mg by mouth 2 (Two) Times a Day. Decrease to once a day     • metoprolol tartrate (LOPRESSOR) 100 MG tablet Take 100 mg by mouth 2 (Two) Times a Day.     • Morphine (MSIR) 15 MG tablet Take 15 mg by mouth 2 (Two) Times a Day.     • simvastatin (ZOCOR) 40 MG tablet Take 40 mg by mouth Every  Night.       No current facility-administered medications for this visit.        Patient has no known allergies.    Past Medical History:   Diagnosis Date   • H/O kyphoplasty 2016   • History of back surgery    • History of elbow surgery     (r) ELBOW LANCED   • Hypercholesterolemia    • Hyperlipidemia    • Hypertension    • MR (mitral regurgitation)    • PVC's (premature ventricular contractions)        Social History     Socioeconomic History   • Marital status:      Spouse name: Not on file   • Number of children: Not on file   • Years of education: Not on file   • Highest education level: Not on file   Social Needs   • Financial resource strain: Not on file   • Food insecurity - worry: Not on file   • Food insecurity - inability: Not on file   • Transportation needs - medical: Not on file   • Transportation needs - non-medical: Not on file   Occupational History   • Not on file   Tobacco Use   • Smoking status: Former Smoker     Last attempt to quit: 1956     Years since quittin.9   • Smokeless tobacco: Never Used   Substance and Sexual Activity   • Alcohol use: No   • Drug use: No   • Sexual activity: Not on file   Other Topics Concern   • Not on file   Social History Narrative   • Not on file       Family History   Problem Relation Age of Onset   • Heart disease Father        Review of Systems   Constitution: Positive for weakness and malaise/fatigue. Negative for decreased appetite.   HENT: Positive for hearing loss. Negative for congestion and nosebleeds.    Eyes: Negative for pain and redness.   Cardiovascular: Negative for chest pain, leg swelling, near-syncope and palpitations.   Respiratory: Positive for shortness of breath. Negative for cough.    Hematologic/Lymphatic: Negative for bleeding problem. Bruises/bleeds easily.   Musculoskeletal: Positive for joint pain and muscle weakness.   Gastrointestinal: Negative for bloating, dysphagia and melena.   Genitourinary: Positive for  "nocturia. Negative for dysuria and hematuria.   Neurological: Positive for loss of balance. Negative for headaches and light-headedness.   Psychiatric/Behavioral: Negative for memory loss. The patient is not nervous/anxious.         Objective     /70 (BP Location: Left arm)   Pulse 73   Ht 180.3 cm (70.98\")   Wt 65.9 kg (145 lb 4 oz)   BMI 20.27 kg/m²     Physical Exam   Constitutional: He is oriented to person, place, and time. He appears well-nourished.   HENT:   Head: Normocephalic.   Eyes: Pupils are equal, round, and reactive to light.   Neck: Normal range of motion. Carotid bruit is not present.   Cardiovascular: Normal rate, regular rhythm, S1 normal and S2 normal.   Murmur heard.  Pulses:       Radial pulses are 2+ on the right side, and 2+ on the left side.   Pulmonary/Chest: He has decreased breath sounds. He has no rales.   Abdominal: Soft. Bowel sounds are normal. There is no tenderness.   Musculoskeletal: Normal range of motion. He exhibits no edema.   Neurological: He is alert and oriented to person, place, and time.   Skin: Skin is warm and dry.   Psychiatric: He has a normal mood and affect.      Procedures: none today        Assessment/Plan      Max was seen today for follow-up and med refill.    Diagnoses and all orders for this visit:    LV dysfunction    Essential hypertension    Hypercholesteremia    Non-rheumatic mitral regurgitation    PVC (premature ventricular contraction)    Medication management    His blood pressure, heart rate and rhythm today are normal. He is taking Metoprolol 100 mg bid. He has stopped CCB, ACE and Coreg. Advised to continue same dose Metoprolol and monitor vital signs.     The dose of Lasix decreased to 40 mg once a day and second dose if needed for swelling or increased shortness of breath.     He will follow with you for labs and medication refills.     The results of his last echocardiogram was reviewed, similar to prior. No repeat cardiac test " advised at this time.     Patient's Body mass index is 20.27 kg/m². BMI is within normal parameters. No follow-up required. Heart healthy diet encouraged.       A 6 month follow up visit scheduled.            Electronically signed by GABRIELE Chen,  December 5, 2018 4:57 PM

## 2019-01-01 ENCOUNTER — OFFICE VISIT (OUTPATIENT)
Dept: CARDIOLOGY | Facility: CLINIC | Age: 84
End: 2019-01-01

## 2019-01-01 VITALS
BODY MASS INDEX: 21.98 KG/M2 | HEART RATE: 68 BPM | DIASTOLIC BLOOD PRESSURE: 64 MMHG | SYSTOLIC BLOOD PRESSURE: 130 MMHG | HEIGHT: 71 IN | WEIGHT: 157 LBS

## 2019-01-01 DIAGNOSIS — I49.3 PVC (PREMATURE VENTRICULAR CONTRACTION): ICD-10-CM

## 2019-01-01 DIAGNOSIS — E78.00 HYPERCHOLESTEREMIA: ICD-10-CM

## 2019-01-01 DIAGNOSIS — I25.118 CORONARY ARTERY DISEASE OF NATIVE ARTERY OF NATIVE HEART WITH STABLE ANGINA PECTORIS (HCC): Primary | ICD-10-CM

## 2019-01-01 DIAGNOSIS — I10 ESSENTIAL HYPERTENSION: ICD-10-CM

## 2019-01-01 DIAGNOSIS — M40.295 OTHER KYPHOSIS OF THORACOLUMBAR REGION: ICD-10-CM

## 2019-01-01 DIAGNOSIS — Z91.81 AT HIGH RISK FOR FALLS: ICD-10-CM

## 2019-01-01 DIAGNOSIS — R06.02 SHORTNESS OF BREATH: ICD-10-CM

## 2019-01-01 DIAGNOSIS — R00.2 PALPITATIONS: ICD-10-CM

## 2019-01-01 DIAGNOSIS — C80.1 CANCER (HCC): ICD-10-CM

## 2019-01-01 PROCEDURE — 99213 OFFICE O/P EST LOW 20 MIN: CPT | Performed by: NURSE PRACTITIONER

## 2019-06-06 ENCOUNTER — OFFICE VISIT (OUTPATIENT)
Dept: CARDIOLOGY | Facility: CLINIC | Age: 84
End: 2019-06-06

## 2019-06-06 VITALS
HEIGHT: 71 IN | BODY MASS INDEX: 22.12 KG/M2 | DIASTOLIC BLOOD PRESSURE: 60 MMHG | WEIGHT: 158 LBS | SYSTOLIC BLOOD PRESSURE: 112 MMHG | HEART RATE: 60 BPM

## 2019-06-06 DIAGNOSIS — I25.10 CORONARY ARTERY DISEASE INVOLVING NATIVE CORONARY ARTERY OF NATIVE HEART WITHOUT ANGINA PECTORIS: Primary | ICD-10-CM

## 2019-06-06 DIAGNOSIS — R94.39 ABNORMAL STRESS TEST: ICD-10-CM

## 2019-06-06 DIAGNOSIS — I10 ESSENTIAL HYPERTENSION: ICD-10-CM

## 2019-06-06 DIAGNOSIS — I34.0 NON-RHEUMATIC MITRAL REGURGITATION: ICD-10-CM

## 2019-06-06 DIAGNOSIS — E78.00 HYPERCHOLESTEREMIA: ICD-10-CM

## 2019-06-06 DIAGNOSIS — I49.3 PVC (PREMATURE VENTRICULAR CONTRACTION): ICD-10-CM

## 2019-06-06 PROCEDURE — 99213 OFFICE O/P EST LOW 20 MIN: CPT | Performed by: NURSE PRACTITIONER

## 2019-06-06 RX ORDER — POLYETHYLENE GLYCOL 3350 17 G/17G
17 POWDER, FOR SOLUTION ORAL DAILY
COMMUNITY

## 2019-06-06 RX ORDER — NITROGLYCERIN 0.4 MG/1
TABLET SUBLINGUAL
Qty: 25 TABLET | Refills: 3 | Status: SHIPPED | OUTPATIENT
Start: 2019-06-06 | End: 2020-01-01

## 2019-06-11 ENCOUNTER — TELEPHONE (OUTPATIENT)
Dept: CARDIOLOGY | Facility: CLINIC | Age: 84
End: 2019-06-11

## 2019-06-11 NOTE — TELEPHONE ENCOUNTER
Bettie from [facility] called.  PCP had checked an overnight which was abnormal. They have addressed the O2 order but it also showed pt bradycardiac for over 200 minutes, lowest being 37 but his average pulse was 59. They wanted you to review the report and see if you felt anything else needed ordered. Under media for you to review.

## 2019-12-12 NOTE — PROGRESS NOTES
Chief Complaint   Patient presents with   • Follow-up     For cardiac management. Patient is on aspirin. Has had some chest pain, one nitro stops. Reports that he hdoes have shortness of breath. Does have some dizziness when he stands occasionally, states that he does take his time standing to prevent. Last lab work was done with Dr. Smith within the last few weeks, not in chart.    • Med Refill     PCP does medication refills. Brought medications with visit.        Cardiac Complaints  chest pressure/discomfort and dyspnea      Subjective   Santana ALFREDO Velarde is a 85 y.o. male with HTN, hyperlipidemia, cancer, and PVCs whose stress in 2010 showed inferior ischemia managed medically.  More SOB reported in 2017.  Echo remained stable.  Stress not repeated as he was unable to lie flat on the table.  He has significant osteoporosis, multiple stress fractures and kyphoplasties have been done. He was diagnosed with multiple myeloma with involvement of thoracic and lumbar spine and right shoulder, managed by Dr. Smith. On 9/7/18, he was admitted to [facility] with  bilateral pneumonia, possible acute CHF. He responded well to treatment and was discharged home prior to echo report available. 9/10/18 echo report per Dr. Castillo showed LVEF 50-55%, mild MR and AI, RVSP 44 mmHg. Lasix was prescribed on prn basis. In October 2018, he experienced a fall resulting in right femur fracture. He underwent surgery without complications. Rhythm showed multiple PVCs and PACs, no PAF. Cardiac enzymes were mildly elevated and Dr. Murguia consulted.  Echocardiogram ordered and showed LVEF stable at 45-50%. He developed heart failure symptoms and pneumonia, later gout and shingles.     He returns today for follow up accompanied by his wife. He does report some chest pain at random but denies it is exertional. He admits most often when he is resting. It will go away with one NTG and has not returned since November. He does admit to some  palpitations but states this has been the same for years. It is unchanged from prior.  He denies any falls or syncope in regards. He does have some shortness of breath but nothing severe, does not appear to be affecting daily function.  Dizziness only when standing too quickly.  Labs he reports with your office and Dr. Smith, no current available. No refills needed as they are followed by your office.          Cardiac History  Past Surgical History:   Procedure Laterality Date   • CARDIOVASCULAR STRESS TEST  03/17/2008    7 min, 81% THR, Apicoinferior Ischemia.   • CARDIOVASCULAR STRESS TEST  06/15/2010    7 min, 30 sec, 77% THR. Inferior ischemia.   • ECHO - CONVERTED  03/04/2008    EF 60%, Mild AI, Mod MR, Freq PVC's, Anterior WMA.   • ECHO - CONVERTED  04/22/2010    EF 60 %, mild to mod MR   • ECHO - CONVERTED  04/13/2017    EF 65%, no AS, mild to mod MR   • ECHO - CONVERTED  09/10/2018    [facility]- Dr. Castillo- LVEF 50-55%, mild to moderate diastolic dysfunction, mild AI, mild MR, RVSP 44 mmHg   • ECHO - CONVERTED  10/15/2018    LVEF 45-50%, mild MR, PA pressure not calc       Current Outpatient Medications   Medication Sig Dispense Refill   • aspirin 81 MG EC tablet Take 81 mg by mouth Daily.     • calcium carbonate (OS-DEENA) 600 MG tablet Take 600 mg by mouth Daily.     • cetirizine (zyrTEC) 10 MG tablet Take 10 mg by mouth Daily.     • docusate calcium (SURFAK) 240 MG capsule Take 240 mg by mouth As Needed for Constipation.     • FERROUS SULFATE ER PO Take 1 tablet by mouth Daily.     • finasteride (PROSCAR) 5 MG tablet Take 5 mg by mouth Daily.     • Fluticasone Furoate-Vilanterol (BREO ELLIPTA IN) Inhale Daily.     • furosemide (LASIX) 40 MG tablet Take 40 mg by mouth 2 (Two) Times a Day. Decrease to once a day     • metoprolol tartrate (LOPRESSOR) 100 MG tablet Take 100 mg by mouth 2 (Two) Times a Day.     • Morphine (MSIR) 15 MG tablet One tablet in morning and 2 tablets at night     • nitroglycerin  (NITROSTAT) 0.4 MG SL tablet 1 under the tongue as needed for angina, may repeat q5mins for up three doses 25 tablet 3   • polyethylene glycol (MIRALAX) packet Take 17 g by mouth Daily.     • simvastatin (ZOCOR) 40 MG tablet Take 40 mg by mouth Every Night.     • Umeclidinium Bromide (INCRUSE ELLIPTA IN) Inhale.       No current facility-administered medications for this visit.        Patient has no known allergies.    Past Medical History:   Diagnosis Date   • Cancer (CMS/HCC)    • Gout    • H/O kyphoplasty 2016   • History of back surgery    • History of elbow surgery     (r) ELBOW LANCED   • Hypercholesterolemia    • Hyperlipidemia    • Hypertension    • MR (mitral regurgitation)    • Multiple myeloma (CMS/HCC)    • PVC's (premature ventricular contractions)        Social History     Socioeconomic History   • Marital status:      Spouse name: Not on file   • Number of children: Not on file   • Years of education: Not on file   • Highest education level: Not on file   Tobacco Use   • Smoking status: Former Smoker     Last attempt to quit: 1956     Years since quittin.9   • Smokeless tobacco: Never Used   Substance and Sexual Activity   • Alcohol use: No   • Drug use: No       Family History   Problem Relation Age of Onset   • Heart disease Father        Review of Systems   Constitution: Negative for malaise/fatigue and night sweats.   Cardiovascular: Positive for chest pain and dyspnea on exertion. Negative for claudication, leg swelling, near-syncope, orthopnea and palpitations.   Respiratory: Positive for shortness of breath. Negative for cough and wheezing.    Musculoskeletal: Positive for back pain, joint pain and stiffness.   Gastrointestinal: Negative for anorexia, heartburn, nausea and vomiting.   Genitourinary: Negative for dysuria, hematuria, hesitancy and nocturia.   Neurological: Positive for loss of balance. Negative for dizziness, headaches and light-headedness.  "  Psychiatric/Behavioral: Negative for depression and memory loss. The patient is not nervous/anxious.            Objective     /64 (BP Location: Left arm)   Pulse 68   Ht 180.3 cm (70.98\")   Wt 71.2 kg (157 lb)   BMI 21.91 kg/m²     Physical Exam   Constitutional: He is oriented to person, place, and time. He appears well-developed and well-nourished.   HENT:   Head: Normocephalic and atraumatic.   Eyes: Pupils are equal, round, and reactive to light. EOM are normal.   Neck: Normal range of motion. Neck supple.   Cardiovascular: Normal rate and regular rhythm.   Murmur heard.  Pulmonary/Chest: Effort normal and breath sounds normal.   Abdominal: Soft.   Musculoskeletal: Normal range of motion. He exhibits tenderness and deformity.   Neurological: He is alert and oriented to person, place, and time.   Skin: Skin is warm and dry.   Psychiatric: He has a normal mood and affect. His behavior is normal.       Procedures    Assessment/Plan     HTN:  Well managed on current.  No adjustment to current metoprolol therapy recommended.    Palpitations:  Well managed with beta blocker therapy.  He does report continued palpitations but no worse than before, he states it has been that way for years. He was urged on good water intake and limited caffeine.    Edema:  Denied on current lasix therapy.  Limited sodium intake recommended.    Hyperlipidemia:  Managed with zocor therapy.  NO FLP available for review. Remains followed by PCP and he follows for dosing.  Could we have most recent for review?    LV dysfunction:  Most recent echo showed EF stable at 45-50%.  No worsening symptoms reported. No repeat cardiac workup recommended as status stable.    SOA:  Still present, he attributes to kyphosis.  He uses inhaler therapy in regards for management.  Patient follows with pulmonary in regards.    BMI noted at 21.91 and weight stable. He was urged on good caloric intake, limited carbs, and walking with cane " recommended.    Fall risk noted. He will ambulate with cane due to mobility concerns. Adequate lighting advised.    No refills needed    6 month follow up recommended or sooner if needed.          Problems Addressed this Visit        Cardiovascular and Mediastinum    HTN (hypertension)    CAD (coronary artery disease) - Primary    Hypercholesteremia    PVC (premature ventricular contraction)      Other Visit Diagnoses     Shortness of breath        Palpitations        Other kyphosis of thoracolumbar region        Cancer (CMS/HCC)        At high risk for falls              Patient's Body mass index is 21.91 kg/m². BMI is within normal parameters. No follow-up required..                 Electronically signed by GABRIELE Devine December 12, 2019 5:31 PM

## 2020-01-01 ENCOUNTER — OFFICE VISIT (OUTPATIENT)
Dept: CARDIOLOGY | Facility: CLINIC | Age: 85
End: 2020-01-01

## 2020-01-01 VITALS
TEMPERATURE: 97.7 F | HEART RATE: 64 BPM | SYSTOLIC BLOOD PRESSURE: 140 MMHG | DIASTOLIC BLOOD PRESSURE: 62 MMHG | BODY MASS INDEX: 21.39 KG/M2 | HEIGHT: 71 IN | WEIGHT: 152.8 LBS

## 2020-01-01 DIAGNOSIS — E78.00 HYPERCHOLESTEREMIA: ICD-10-CM

## 2020-01-01 DIAGNOSIS — R94.39 ABNORMAL STRESS TEST: ICD-10-CM

## 2020-01-01 DIAGNOSIS — I25.10 CORONARY ARTERY DISEASE INVOLVING NATIVE CORONARY ARTERY OF NATIVE HEART WITHOUT ANGINA PECTORIS: ICD-10-CM

## 2020-01-01 DIAGNOSIS — C80.1 CANCER (HCC): ICD-10-CM

## 2020-01-01 DIAGNOSIS — I10 ESSENTIAL HYPERTENSION: ICD-10-CM

## 2020-01-01 DIAGNOSIS — I25.118 CORONARY ARTERY DISEASE OF NATIVE ARTERY OF NATIVE HEART WITH STABLE ANGINA PECTORIS (HCC): ICD-10-CM

## 2020-01-01 DIAGNOSIS — I49.3 PVC (PREMATURE VENTRICULAR CONTRACTION): ICD-10-CM

## 2020-01-01 PROCEDURE — 99213 OFFICE O/P EST LOW 20 MIN: CPT | Performed by: NURSE PRACTITIONER

## 2020-01-01 RX ORDER — NITROGLYCERIN 0.4 MG/1
TABLET SUBLINGUAL
Qty: 25 TABLET | Refills: 3 | Status: SHIPPED | OUTPATIENT
Start: 2020-01-01

## 2020-06-18 NOTE — PROGRESS NOTES
"Chief Complaint   Patient presents with   • Follow-up     Cardiac management . Had labs per PCP.   Is following Dr. Smith  for Multiple Mullai   • Dizziness     Reports as \" all the time \"   • Med Refill     No refills needed today. PCP manages refills . Had medication list today       Dilshad Velarde is a 85 y.o. male with a history of HTN, hyperlipidemia, and PVCs whose stress in 2010 showed inferior ischemia managed medically.  More SOB reported in 2017.  Echo remained stable.  Stress not repeated as he was unable to lie flat on the table.  He has significant osteoporosis, multiple stress fractures and kyphoplasties have been done. He was diagnosed with multiple myeloma with involvement of thoracic and lumbar spine and right shoulder, managed by Dr. Smith. On 9/7/18, he was admitted to [facility] with  bilateral pneumonia, possible acute CHF. He responded well to treatment and was discharged home prior to echo report available. 9/10/18 echo report per Dr. Castillo showed LVEF 50-55%, mild MR and AI, RVSP 44 mmHg. Lasix was prescribed on prn basis. In October 2018, he experienced a fall resulting in right femur fracture. He underwent surgery without complications. Rhythm showed multiple PVCs and PACs, no PAF. Cardiac enzymes were mildly elevated and Dr. Murguia consulted.  Echocardiogram ordered and showed LVEF stable at 45-50%. He developed heart failure symptoms and pneumonia, later gout and shingles.     Today comes to the office accompanied by his wife for follow-up visit.  Patient is very hard of hearing therefore his wife helps him to communicate during the office visit.  He denies recent cardiac symptoms of concern.  No palpitations noted.  He has mild stable anginal symptoms which are no worse than last visit, shortness of breath and dizziness which is chronic in nature and no worse than before.  No recent change in cardiac medications noted.  Inhalers were changed to trelegy which he states " has been beneficial.  His main concern is his cancer treatment as he says most recent is ineffective and anticipates a change in treatment plan.    HPI     Cardiac History:    Past Surgical History:   Procedure Laterality Date   • CARDIOVASCULAR STRESS TEST  03/17/2008    7 min, 81% THR, Apicoinferior Ischemia.   • CARDIOVASCULAR STRESS TEST  06/15/2010    7 min, 30 sec, 77% THR. Inferior ischemia.   • ECHO - CONVERTED  03/04/2008    EF 60%, Mild AI, Mod MR, Freq PVC's, Anterior WMA.   • ECHO - CONVERTED  04/22/2010    EF 60 %, mild to mod MR   • ECHO - CONVERTED  04/13/2017    EF 65%, no AS, mild to mod MR   • ECHO - CONVERTED  09/10/2018    [facility]- Dr. Castillo- LVEF 50-55%, mild to moderate diastolic dysfunction, mild AI, mild MR, RVSP 44 mmHg   • ECHO - CONVERTED  10/15/2018    LVEF 45-50%, mild MR, PA pressure not calc       Current Outpatient Medications   Medication Sig Dispense Refill   • albuterol (PROVENTIL) (5 MG/ML) 0.5% nebulizer solution Take 2.5 mg by nebulization Every 6 (Six) Hours As Needed for Wheezing.     • aspirin 81 MG EC tablet Take 81 mg by mouth Daily.     • calcium carbonate (OS-DEENA) 600 MG tablet Take 600 mg by mouth Daily.     • cetirizine (zyrTEC) 10 MG tablet Take 10 mg by mouth Daily.     • docusate calcium (SURFAK) 240 MG capsule Take 240 mg by mouth As Needed for Constipation.     • FERROUS SULFATE ER PO Take 1 tablet by mouth Daily.     • finasteride (PROSCAR) 5 MG tablet Take 5 mg by mouth Daily.     • furosemide (LASIX) 40 MG tablet Take 40 mg by mouth 2 (Two) Times a Day. Decrease to once a day     • metoprolol tartrate (LOPRESSOR) 100 MG tablet Take 100 mg by mouth 2 (Two) Times a Day.     • Morphine (MSIR) 15 MG tablet One tablet in morning and 2 tablets at night     • nitroglycerin (NITROSTAT) 0.4 MG SL tablet 1 under the tongue as needed for angina, may repeat q5mins for up three doses 25 tablet 3   • polyethylene glycol (MIRALAX) packet Take 17 g by mouth Daily.     •  simvastatin (ZOCOR) 40 MG tablet Take 40 mg by mouth Every Night.       No current facility-administered medications for this visit.        Patient has no known allergies.    Past Medical History:   Diagnosis Date   • Cancer (CMS/HCC)    • Gout    • H/O kyphoplasty 2016   • History of back surgery    • History of elbow surgery     (r) ELBOW LANCED   • Hypercholesterolemia    • Hyperlipidemia    • Hypertension    • MR (mitral regurgitation)    • Multiple myeloma (CMS/HCC)    • PVC's (premature ventricular contractions)        Social History     Socioeconomic History   • Marital status:      Spouse name: Not on file   • Number of children: Not on file   • Years of education: Not on file   • Highest education level: Not on file   Tobacco Use   • Smoking status: Former Smoker     Last attempt to quit: 1956     Years since quittin.5   • Smokeless tobacco: Never Used   Substance and Sexual Activity   • Alcohol use: No   • Drug use: No       Family History   Problem Relation Age of Onset   • Heart disease Father        Review of Systems   Constitution: Positive for decreased appetite, malaise/fatigue and weight loss (5 pounds). Negative for diaphoresis and fever.   HENT: Positive for hearing loss. Negative for congestion, hoarse voice and nosebleeds.    Cardiovascular: Negative for chest pain, leg swelling, near-syncope and palpitations.   Respiratory: Positive for shortness of breath.    Endocrine: Positive for cold intolerance and heat intolerance.   Hematologic/Lymphatic: Negative for bleeding problem. Bruises/bleeds easily.   Skin: Negative for itching and rash.   Musculoskeletal: Positive for back pain and muscle weakness (legs). Negative for falls.   Gastrointestinal: Negative for dysphagia, melena and nausea.   Genitourinary: Negative for dysuria and hematuria.   Neurological: Positive for dizziness and weakness. Negative for headaches.   Psychiatric/Behavioral: Negative for memory loss. The  "patient is not nervous/anxious.         Objective     /62 (BP Location: Left arm)   Pulse 64   Temp 97.7 °F (36.5 °C)   Ht 180.3 cm (70.98\")   Wt 69.3 kg (152 lb 12.8 oz)   BMI 21.32 kg/m²     Physical Exam   Constitutional: He is oriented to person, place, and time. He appears well-nourished.   HENT:   Head: Normocephalic.   Eyes: Pupils are equal, round, and reactive to light.   Neck: Normal range of motion.   Cardiovascular: Normal rate, regular rhythm and S1 normal.   Murmur heard.  Pulses:       Radial pulses are 2+ on the right side, and 2+ on the left side.   Slightly loud S2   Pulmonary/Chest: He has decreased breath sounds. He has no rales.   Abdominal: Soft. Bowel sounds are normal.   Musculoskeletal: Normal range of motion. He exhibits no edema.   Neurological: He is alert and oriented to person, place, and time.   Skin: Skin is warm.   Psychiatric: He has a normal mood and affect.        Procedures: none today         Assessment/Plan      Max was seen today for follow-up, dizziness and med refill.    Diagnoses and all orders for this visit:    Coronary artery disease of native artery of native heart with stable angina pectoris (CMS/HCC)    Abnormal stress test    Essential hypertension    Hypercholesteremia    PVC (premature ventricular contraction)    Cancer (CMS/HCC)      From a cardiac standpoint Mr. Velarde feels he has no worsening symptoms.  His blood pressure, heart rate, and heart rhythm are normal today.  Breath sounds are diminished but no rales noted.  No swelling or worsening shortness of breath noted.  His echo in 2018 showed LVEF around 45-50%.  If any symptoms or concerns develop then repeat echocardiogram will be considered.  He has not had to use Nitrostat and prescription is up-to-date.  Advised to continue Lopressor 100 mg twice a day.  He is on daily low-dose aspirin with some bruising noted but no GI symptoms or signs of bleeding.  He is on statin therapy in the form of " Zocor.  He follows with you for lab orders/management.  Please forward copy of most recent lab results?      Patient's Body mass index is 21.32 kg/m². BMI is within normal parameters. No follow-up required.  His weight is down about 5 pounds.  I encouraged him on dietary supplements such as Ensure or boost.     He follows with Dr. Smith for oncology management.    A 6-month follow-up visit scheduled.  Please call sooner for any cardiac concerns.           Electronically signed by Tamar Colon, GABRIELE,  June 19, 2020 08:14

## 2020-06-19 PROBLEM — C80.1 CANCER (HCC): Status: ACTIVE | Noted: 2020-01-01
